# Patient Record
Sex: MALE | Race: WHITE | NOT HISPANIC OR LATINO | Employment: FULL TIME | URBAN - METROPOLITAN AREA
[De-identification: names, ages, dates, MRNs, and addresses within clinical notes are randomized per-mention and may not be internally consistent; named-entity substitution may affect disease eponyms.]

---

## 2017-12-05 ENCOUNTER — CLINICAL SUPPORT (OUTPATIENT)
Dept: INFECTIOUS DISEASES | Facility: CLINIC | Age: 47
End: 2017-12-05
Payer: COMMERCIAL

## 2017-12-05 ENCOUNTER — OFFICE VISIT (OUTPATIENT)
Dept: INFECTIOUS DISEASES | Facility: CLINIC | Age: 47
End: 2017-12-05
Payer: COMMERCIAL

## 2017-12-05 VITALS
HEART RATE: 81 BPM | WEIGHT: 288.13 LBS | BODY MASS INDEX: 33.34 KG/M2 | DIASTOLIC BLOOD PRESSURE: 79 MMHG | TEMPERATURE: 98 F | SYSTOLIC BLOOD PRESSURE: 111 MMHG | HEIGHT: 78 IN

## 2017-12-05 DIAGNOSIS — Z29.89 ENCOUNTER FOR ALTITUDE SICKNESS PROPHYLAXIS: ICD-10-CM

## 2017-12-05 DIAGNOSIS — Z23 NEED FOR IMMUNIZATION AGAINST TYPHOID: ICD-10-CM

## 2017-12-05 DIAGNOSIS — Z23 NEED FOR HEPATITIS A AND B VACCINATION: ICD-10-CM

## 2017-12-05 DIAGNOSIS — Z23 NEED FOR IMMUNIZATION AGAINST YELLOW FEVER: ICD-10-CM

## 2017-12-05 DIAGNOSIS — Z29.89 NEED FOR MALARIA PROPHYLAXIS: ICD-10-CM

## 2017-12-05 DIAGNOSIS — Z71.85 VACCINE COUNSELING: Primary | ICD-10-CM

## 2017-12-05 PROCEDURE — 99999 PR PBB SHADOW E&M-EST. PATIENT-LVL I: CPT | Mod: PBBFAC,,,

## 2017-12-05 PROCEDURE — 90471 IMMUNIZATION ADMIN: CPT | Mod: S$GLB,,, | Performed by: INTERNAL MEDICINE

## 2017-12-05 PROCEDURE — 90636 HEP A/HEP B VACC ADULT IM: CPT | Mod: S$GLB,,, | Performed by: INTERNAL MEDICINE

## 2017-12-05 PROCEDURE — 99203 OFFICE O/P NEW LOW 30 MIN: CPT | Mod: S$GLB,,, | Performed by: INTERNAL MEDICINE

## 2017-12-05 PROCEDURE — 99999 PR PBB SHADOW E&M-EST. PATIENT-LVL III: CPT | Mod: PBBFAC,,, | Performed by: INTERNAL MEDICINE

## 2017-12-05 PROCEDURE — 90717 YELLOW FEVER VACCINE SUBQ: CPT | Mod: S$GLB,,, | Performed by: INTERNAL MEDICINE

## 2017-12-05 PROCEDURE — 90472 IMMUNIZATION ADMIN EACH ADD: CPT | Mod: S$GLB,,, | Performed by: INTERNAL MEDICINE

## 2017-12-05 RX ORDER — ACETAZOLAMIDE 250 MG/1
250 TABLET ORAL 2 TIMES DAILY
Qty: 10 TABLET | Refills: 0 | Status: SHIPPED | OUTPATIENT
Start: 2017-12-05 | End: 2017-12-10

## 2017-12-05 RX ORDER — ATOVAQUONE AND PROGUANIL HYDROCHLORIDE 250; 100 MG/1; MG/1
1 TABLET, FILM COATED ORAL DAILY
Qty: 40 TABLET | Refills: 0 | Status: SHIPPED | OUTPATIENT
Start: 2017-12-05

## 2017-12-05 RX ORDER — AZITHROMYCIN 500 MG/1
500 TABLET, FILM COATED ORAL DAILY
Qty: 6 TABLET | Refills: 0 | Status: SHIPPED | OUTPATIENT
Start: 2017-12-05

## 2017-12-05 NOTE — PROGRESS NOTES
Patient received Twinrix and Stamaril Yellow Fever vaccines. Tolerated well and left in NAD. W/yellow card documentation.

## 2017-12-05 NOTE — PROGRESS NOTES
Subjective:      Chief Complaint: Pretravel consultation      History of Present Illness    Patient  47 y.o. male who presents today for routine pretravel consultation.     The patient will be traveling to [facility] on 1/5/2018 for 4 weeks. He plans on visiting the [place], [facility], and [facility]. He will be staying in bed and breakfasts. The purpose of the trip is vacation.     The patient reports that they received the following routine vaccinations: MMR, polio, influenza 2017, Tdap.         Review of Systems   Constitutional: Negative for chills, diaphoresis, fever and weight loss.   HENT: Negative for congestion, sinus pain and sore throat.    Eyes: Negative for photophobia and pain.   Respiratory: Negative for cough, sputum production and shortness of breath.    Cardiovascular: Negative for chest pain and leg swelling.   Gastrointestinal: Negative for abdominal pain, diarrhea, nausea and vomiting.   Genitourinary: Negative for dysuria and hematuria.   Musculoskeletal: Negative for joint pain.   Skin: Negative for rash.   Neurological: Negative for focal weakness and headaches.   Psychiatric/Behavioral: Negative for depression. The patient is not nervous/anxious.        Objective:   Physical Exam   Constitutional: He is oriented to person, place, and time and well-developed, well-nourished, and in no distress. No distress.   HENT:   Head: Normocephalic and atraumatic.   Eyes: Conjunctivae and EOM are normal.   Neck: Normal range of motion. Neck supple.   Cardiovascular: Normal rate.    Pulmonary/Chest: Effort normal. No respiratory distress.   Abdominal: Soft. He exhibits no distension.   Musculoskeletal: Normal range of motion. He exhibits no edema.   Neurological: He is alert and oriented to person, place, and time. Gait normal.   Skin: Skin is warm and dry. No rash noted. He is not diaphoretic. No erythema.   Psychiatric: Mood, memory, affect and judgment normal.   Vitals reviewed.     Assessment:    47-year-old male  - Pre-Travel clinic assessment  - Vaccine counseling  - Need for malaria prophylaxis  - Need for altitude sickness prophylaxis  - Need for hepatitis A and B vaccination  - Need for typhoid vaccination  - Need for yellow fever vaccination    Plan:     The Patient was provided with an extensive travel guidance packet which provides travel information specific to the patients itinerary.     The patient's medical history was reviewed and the patient was counseled on:  -Dietary precautions.  -Personal protective measures to prevent insect-borne diseases (e.g., malaria, dengue).  -Precautions to prevent exposure to rabies and seek treatment for possible exposures.  -Precautions against sun exposure.  -Precautions against development of DVT during flight.  -Personal and travel safety.    The patient's immunization history was reviewed and, based on the patient's itinerary, the following immunizations were ordered:  Hepatitis A/B at 0, 1, 6 months  Typhoid  Yellow fever    Because of the nationwide shortage of Yellow Fever vaccine, the patient was offered Stamaril vaccine through the research protocol. Inclusion and exclusion criteria were reviewed with the patient and the form completed. Risks and benefits were discussed. The consent was reviewed by me in detail with the patient and all questions were answered. The patient agrees to participate, and signed the consent. A copy was given to him. He was advised to return and or report any significant side effects related to the vaccine.      The patient was encouraged to contact us about any problems that may develop after immunization and possible side effects were reviewed.    The patient was instructed to purchase Imodium over the counter to take in case diarrhea (without blood or fever) develops. An antibiotic was ordered for treatment if severe or bloody diarrhea develops and the patient was instructed on use and possible side effects.  - If develops  3 episodes of diarrhea within 24 hours, take azithromycin 500 mg PO x 3 days     The patient was also instructed to purchase insect repellent containing DEET or [place] and apply according to repellent label instructions.  If indicated by the patients itinerary an anti-malarial agent was prescribed for malaria prophylaxis and possible side effects were reviewed.  - Atovaquone-proguanil 250-100 mg PO qdaily, start 2 days before expected exposure and continue for 7 days after last day of exposure.    Patient was given a prescription for altitude sickness prophylaxis.  - Acetazolamide 250 mg PO BID 48 hours before ascent and 48 hours at high altitude.     The patient was instructed to contact us if problems develop after travel.    Shruti Ang MD MPH  [facility]-Infectious Disease

## 2017-12-07 ENCOUNTER — HOSPITAL ENCOUNTER (OUTPATIENT)
Dept: PREADMISSION TESTING | Facility: HOSPITAL | Age: 47
Discharge: HOME | End: 2017-12-07
Attending: OTOLARYNGOLOGY
Payer: COMMERCIAL

## 2017-12-07 VITALS
BODY MASS INDEX: 32.97 KG/M2 | HEIGHT: 78 IN | TEMPERATURE: 97 F | RESPIRATION RATE: 18 BRPM | SYSTOLIC BLOOD PRESSURE: 135 MMHG | OXYGEN SATURATION: 97 % | DIASTOLIC BLOOD PRESSURE: 79 MMHG | HEART RATE: 78 BPM | WEIGHT: 285 LBS

## 2017-12-07 DIAGNOSIS — Z01.818 PRE-OP TESTING: Primary | ICD-10-CM

## 2017-12-07 LAB
APTT BLDCRRT: 26.8 SEC
BASOPHILS # BLD AUTO: 0.03 K/UL
BASOPHILS NFR BLD: 0.4 %
BILIRUB UR QL STRIP: NEGATIVE
CLARITY UR: CLEAR
COLOR UR: YELLOW
DIFFERENTIAL METHOD: ABNORMAL
EOSINOPHIL # BLD AUTO: 0.1 K/UL
EOSINOPHIL NFR BLD: 0.7 %
ERYTHROCYTE [DISTWIDTH] IN BLOOD BY AUTOMATED COUNT: 13.5 %
GLUCOSE UR QL STRIP: NEGATIVE
HCT VFR BLD AUTO: 39.2 %
HGB BLD-MCNC: 13.3 G/DL
HGB UR QL STRIP: NEGATIVE
INR PPP: 1
KETONES UR QL STRIP: NEGATIVE
LEUKOCYTE ESTERASE UR QL STRIP: NEGATIVE
LYMPHOCYTES # BLD AUTO: 1.6 K/UL
LYMPHOCYTES NFR BLD: 18.5 %
MCH RBC QN AUTO: 31.1 PG
MCHC RBC AUTO-ENTMCNC: 33.9 G/DL
MCV RBC AUTO: 92 FL
MICROSCOPIC COMMENT: ABNORMAL
MONOCYTES # BLD AUTO: 0.6 K/UL
MONOCYTES NFR BLD: 7.2 %
NEUTROPHILS # BLD AUTO: 6.1 K/UL
NEUTROPHILS NFR BLD: 73.2 %
NITRITE UR QL STRIP: NEGATIVE
NON-SQ EPI CELLS #/AREA URNS HPF: 2 /HPF
PH UR STRIP: 6 [PH] (ref 5–8)
PLATELET # BLD AUTO: 258 K/UL
PMV BLD AUTO: 9.9 FL
PROT UR QL STRIP: NEGATIVE
PROTHROMBIN TIME: 10.6 SEC
RBC # BLD AUTO: 4.27 M/UL
SP GR UR STRIP: 1.02 (ref 1–1.03)
URN SPEC COLLECT METH UR: NORMAL
UROBILINOGEN UR STRIP-ACNC: NEGATIVE EU/DL
WBC # BLD AUTO: 8.38 K/UL
WBC #/AREA URNS HPF: 2 /HPF (ref 0–5)

## 2017-12-07 PROCEDURE — 81000 URINALYSIS NONAUTO W/SCOPE: CPT

## 2017-12-07 PROCEDURE — 85610 PROTHROMBIN TIME: CPT

## 2017-12-07 PROCEDURE — 85025 COMPLETE CBC W/AUTO DIFF WBC: CPT

## 2017-12-07 PROCEDURE — 36415 COLL VENOUS BLD VENIPUNCTURE: CPT

## 2017-12-07 PROCEDURE — 85730 THROMBOPLASTIN TIME PARTIAL: CPT

## 2017-12-07 NOTE — DISCHARGE INSTRUCTIONS
Your surgery is scheduled for____12/12/2017_____________.    Call [phone] between 2 pm and 5 pm ___12/11/2017_________ to find out your arrival time for the day of surgery.    Report to SAME DAY SURGERY UNIT at _______am on the 2nd floor of the hospital.  Use the front entrance of the hospital before 6 am.  If you need wheelchair assistance, call [phone] from your cell phone,  or call 0 from the courtesy phone in the hospital lobby.    Important instructions:   Do not eat or drink after 12 midnight, including water.  It is okay to brush your teeth.  Do not have gum, candy or mints.       Please shower the night before and the morning of your surgery.       You may wear deodorant only.      Do not wear powder, body lotion or cologne.     Do not wear any jewelry or have any metal on your body.     Please bring any documents given to you by your doctor.     If you are going home on the same day of surgery, you must have arrangements for a ride home.  You will not be able to drive home if you were given anesthesia or sedation.     Stop taking Aspirin, Ibuprofen, Motrin and Aleve at least 7 days before your surgery. You may use Tylenol.     Stop taking fish oil and vitamin E for least 7 days before surgery.     Wear loose fitting clothes allowing for bandages.     Please leave money and valuables home.       You may bring your cell phone.     Call the doctor if fever or illness should occur before your surgery.    Call [phone] to contact us here at [facility] if needed.

## 2017-12-11 ENCOUNTER — ANESTHESIA EVENT (OUTPATIENT)
Dept: SURGERY | Facility: HOSPITAL | Age: 47
End: 2017-12-11
Payer: COMMERCIAL

## 2017-12-12 ENCOUNTER — ANESTHESIA (OUTPATIENT)
Dept: SURGERY | Facility: HOSPITAL | Age: 47
End: 2017-12-12
Payer: COMMERCIAL

## 2017-12-12 ENCOUNTER — HOSPITAL ENCOUNTER (OUTPATIENT)
Facility: HOSPITAL | Age: 47
Discharge: HOME | End: 2017-12-12
Attending: OTOLARYNGOLOGY | Admitting: OTOLARYNGOLOGY
Payer: COMMERCIAL

## 2017-12-12 VITALS
SYSTOLIC BLOOD PRESSURE: 116 MMHG | DIASTOLIC BLOOD PRESSURE: 74 MMHG | OXYGEN SATURATION: 97 % | WEIGHT: 285 LBS | RESPIRATION RATE: 18 BRPM | BODY MASS INDEX: 32.97 KG/M2 | HEIGHT: 78 IN | TEMPERATURE: 98 F | HEART RATE: 68 BPM

## 2017-12-12 DIAGNOSIS — J34.2 NOSE SEPTUM DEVIATION: Primary | ICD-10-CM

## 2017-12-12 PROCEDURE — 71000015 HC POSTOP RECOV 1ST HR: Performed by: OTOLARYNGOLOGY

## 2017-12-12 PROCEDURE — 27201423 OPTIME MED/SURG SUP & DEVICES STERILE SUPPLY: Performed by: OTOLARYNGOLOGY

## 2017-12-12 PROCEDURE — 37000009 HC ANESTHESIA EA ADD 15 MINS: Performed by: OTOLARYNGOLOGY

## 2017-12-12 PROCEDURE — 88305 TISSUE EXAM BY PATHOLOGIST: CPT | Performed by: PATHOLOGY

## 2017-12-12 PROCEDURE — 25000003 PHARM REV CODE 250: Performed by: NURSE ANESTHETIST, CERTIFIED REGISTERED

## 2017-12-12 PROCEDURE — 63600175 PHARM REV CODE 636 W HCPCS: Performed by: NURSE ANESTHETIST, CERTIFIED REGISTERED

## 2017-12-12 PROCEDURE — 88305 TISSUE EXAM BY PATHOLOGIST: CPT | Mod: 26,,, | Performed by: PATHOLOGY

## 2017-12-12 PROCEDURE — 63600175 PHARM REV CODE 636 W HCPCS: Performed by: OTOLARYNGOLOGY

## 2017-12-12 PROCEDURE — S0028 INJECTION, FAMOTIDINE, 20 MG: HCPCS | Performed by: NURSE ANESTHETIST, CERTIFIED REGISTERED

## 2017-12-12 PROCEDURE — D9220A PRA ANESTHESIA: Mod: ANES,,, | Performed by: ANESTHESIOLOGY

## 2017-12-12 PROCEDURE — D9220A PRA ANESTHESIA: Mod: CRNA,,, | Performed by: NURSE ANESTHETIST, CERTIFIED REGISTERED

## 2017-12-12 PROCEDURE — 71000033 HC RECOVERY, INTIAL HOUR: Performed by: OTOLARYNGOLOGY

## 2017-12-12 PROCEDURE — 88304 TISSUE EXAM BY PATHOLOGIST: CPT | Mod: 26,,, | Performed by: PATHOLOGY

## 2017-12-12 PROCEDURE — 36000711: Performed by: OTOLARYNGOLOGY

## 2017-12-12 PROCEDURE — 37000008 HC ANESTHESIA 1ST 15 MINUTES: Performed by: OTOLARYNGOLOGY

## 2017-12-12 PROCEDURE — 71000016 HC POSTOP RECOV ADDL HR: Performed by: OTOLARYNGOLOGY

## 2017-12-12 PROCEDURE — 36000710: Performed by: OTOLARYNGOLOGY

## 2017-12-12 PROCEDURE — 25000003 PHARM REV CODE 250: Performed by: ANESTHESIOLOGY

## 2017-12-12 PROCEDURE — 25000003 PHARM REV CODE 250: Performed by: OTOLARYNGOLOGY

## 2017-12-12 PROCEDURE — C2625 STENT, NON-COR, TEM W/DEL SY: HCPCS | Performed by: OTOLARYNGOLOGY

## 2017-12-12 DEVICE — IMPLANT PROPEL MOMETASONE: Type: IMPLANTABLE DEVICE | Site: NOSE | Status: FUNCTIONAL

## 2017-12-12 RX ORDER — LABETALOL HYDROCHLORIDE 5 MG/ML
INJECTION, SOLUTION INTRAVENOUS
Status: DISCONTINUED | OUTPATIENT
Start: 2017-12-12 | End: 2017-12-12

## 2017-12-12 RX ORDER — ROCURONIUM BROMIDE 10 MG/ML
INJECTION, SOLUTION INTRAVENOUS
Status: DISCONTINUED | OUTPATIENT
Start: 2017-12-12 | End: 2017-12-12

## 2017-12-12 RX ORDER — SODIUM CHLORIDE, SODIUM LACTATE, POTASSIUM CHLORIDE, CALCIUM CHLORIDE 600; 310; 30; 20 MG/100ML; MG/100ML; MG/100ML; MG/100ML
INJECTION, SOLUTION INTRAVENOUS CONTINUOUS
Status: DISCONTINUED | OUTPATIENT
Start: 2017-12-12 | End: 2017-12-12 | Stop reason: HOSPADM

## 2017-12-12 RX ORDER — NEOSTIGMINE METHYLSULFATE 1 MG/ML
INJECTION, SOLUTION INTRAVENOUS
Status: DISCONTINUED | OUTPATIENT
Start: 2017-12-12 | End: 2017-12-12

## 2017-12-12 RX ORDER — DEXAMETHASONE SODIUM PHOSPHATE 4 MG/ML
INJECTION, SOLUTION INTRA-ARTICULAR; INTRALESIONAL; INTRAMUSCULAR; INTRAVENOUS; SOFT TISSUE
Status: DISCONTINUED | OUTPATIENT
Start: 2017-12-12 | End: 2017-12-12

## 2017-12-12 RX ORDER — LIDOCAINE HYDROCHLORIDE 20 MG/ML
JELLY TOPICAL
Status: DISCONTINUED | OUTPATIENT
Start: 2017-12-12 | End: 2017-12-12

## 2017-12-12 RX ORDER — LIDOCAINE HCL/PF 100 MG/5ML
SYRINGE (ML) INTRAVENOUS
Status: DISCONTINUED | OUTPATIENT
Start: 2017-12-12 | End: 2017-12-12

## 2017-12-12 RX ORDER — SUCCINYLCHOLINE CHLORIDE 20 MG/ML
INJECTION INTRAMUSCULAR; INTRAVENOUS
Status: DISCONTINUED | OUTPATIENT
Start: 2017-12-12 | End: 2017-12-12

## 2017-12-12 RX ORDER — PHENYLEPHRINE HYDROCHLORIDE 10 MG/ML
INJECTION INTRAVENOUS
Status: DISCONTINUED | OUTPATIENT
Start: 2017-12-12 | End: 2017-12-12

## 2017-12-12 RX ORDER — FAMOTIDINE 10 MG/ML
INJECTION INTRAVENOUS
Status: DISCONTINUED | OUTPATIENT
Start: 2017-12-12 | End: 2017-12-12

## 2017-12-12 RX ORDER — LIDOCAINE HYDROCHLORIDE 10 MG/ML
1 INJECTION, SOLUTION EPIDURAL; INFILTRATION; INTRACAUDAL; PERINEURAL ONCE
Status: DISCONTINUED | OUTPATIENT
Start: 2017-12-12 | End: 2017-12-12 | Stop reason: HOSPADM

## 2017-12-12 RX ORDER — PROPOFOL 10 MG/ML
VIAL (ML) INTRAVENOUS
Status: DISCONTINUED | OUTPATIENT
Start: 2017-12-12 | End: 2017-12-12

## 2017-12-12 RX ORDER — METOCLOPRAMIDE HYDROCHLORIDE 5 MG/ML
INJECTION INTRAMUSCULAR; INTRAVENOUS
Status: DISCONTINUED | OUTPATIENT
Start: 2017-12-12 | End: 2017-12-12

## 2017-12-12 RX ORDER — SODIUM CHLORIDE 0.9 G/100ML
IRRIGANT IRRIGATION
Status: DISCONTINUED | OUTPATIENT
Start: 2017-12-12 | End: 2017-12-12 | Stop reason: HOSPADM

## 2017-12-12 RX ORDER — ONDANSETRON 2 MG/ML
INJECTION INTRAMUSCULAR; INTRAVENOUS
Status: DISCONTINUED | OUTPATIENT
Start: 2017-12-12 | End: 2017-12-12

## 2017-12-12 RX ORDER — MIDAZOLAM HYDROCHLORIDE 1 MG/ML
INJECTION, SOLUTION INTRAMUSCULAR; INTRAVENOUS
Status: DISCONTINUED | OUTPATIENT
Start: 2017-12-12 | End: 2017-12-12

## 2017-12-12 RX ORDER — GLYCOPYRROLATE 0.2 MG/ML
INJECTION INTRAMUSCULAR; INTRAVENOUS
Status: DISCONTINUED | OUTPATIENT
Start: 2017-12-12 | End: 2017-12-12

## 2017-12-12 RX ORDER — EPINEPHRINE 1 MG/ML
INJECTION, SOLUTION INTRACARDIAC; INTRAMUSCULAR; INTRAVENOUS; SUBCUTANEOUS
Status: DISCONTINUED | OUTPATIENT
Start: 2017-12-12 | End: 2017-12-12 | Stop reason: HOSPADM

## 2017-12-12 RX ORDER — FENTANYL CITRATE 50 UG/ML
INJECTION, SOLUTION INTRAMUSCULAR; INTRAVENOUS
Status: DISCONTINUED | OUTPATIENT
Start: 2017-12-12 | End: 2017-12-12

## 2017-12-12 RX ORDER — HYDROCODONE BITARTRATE AND ACETAMINOPHEN 5; 325 MG/1; MG/1
1 TABLET ORAL EVERY 4 HOURS PRN
Status: DISCONTINUED | OUTPATIENT
Start: 2017-12-12 | End: 2017-12-12 | Stop reason: HOSPADM

## 2017-12-12 RX ADMIN — FAMOTIDINE 20 MG: 10 INJECTION, SOLUTION INTRAVENOUS at 07:12

## 2017-12-12 RX ADMIN — PROPOFOL 70 MG: 10 INJECTION, EMULSION INTRAVENOUS at 08:12

## 2017-12-12 RX ADMIN — FENTANYL CITRATE 25 MCG: 50 INJECTION INTRAMUSCULAR; INTRAVENOUS at 09:12

## 2017-12-12 RX ADMIN — GLYCOPYRROLATE 0.2 MG: 0.2 INJECTION, SOLUTION INTRAMUSCULAR; INTRAVENOUS at 08:12

## 2017-12-12 RX ADMIN — DEXAMETHASONE SODIUM PHOSPHATE 12 MG: 4 INJECTION, SOLUTION INTRAMUSCULAR; INTRAVENOUS at 07:12

## 2017-12-12 RX ADMIN — LABETALOL HYDROCHLORIDE 2.5 MG: 5 INJECTION, SOLUTION INTRAVENOUS at 08:12

## 2017-12-12 RX ADMIN — PHENYLEPHRINE HYDROCHLORIDE 100 MCG: 10 INJECTION INTRAVENOUS at 08:12

## 2017-12-12 RX ADMIN — PHENYLEPHRINE HYDROCHLORIDE 200 MCG: 10 INJECTION INTRAVENOUS at 09:12

## 2017-12-12 RX ADMIN — SUCCINYLCHOLINE CHLORIDE 100 MG: 20 INJECTION, SOLUTION INTRAMUSCULAR; INTRAVENOUS at 07:12

## 2017-12-12 RX ADMIN — HYDROCODONE BITARTRATE AND ACETAMINOPHEN 1 TABLET: 5; 325 TABLET ORAL at 10:12

## 2017-12-12 RX ADMIN — ROCURONIUM BROMIDE 10 MG: 10 INJECTION, SOLUTION INTRAVENOUS at 09:12

## 2017-12-12 RX ADMIN — SODIUM CHLORIDE, SODIUM LACTATE, POTASSIUM CHLORIDE, AND CALCIUM CHLORIDE: .6; .31; .03; .02 INJECTION, SOLUTION INTRAVENOUS at 10:12

## 2017-12-12 RX ADMIN — PROPOFOL 50 MG: 10 INJECTION, EMULSION INTRAVENOUS at 08:12

## 2017-12-12 RX ADMIN — PROPOFOL 70 MG: 10 INJECTION, EMULSION INTRAVENOUS at 07:12

## 2017-12-12 RX ADMIN — METOCLOPRAMIDE 10 MG: 5 INJECTION, SOLUTION INTRAMUSCULAR; INTRAVENOUS at 07:12

## 2017-12-12 RX ADMIN — PHENYLEPHRINE HYDROCHLORIDE 200 MCG: 10 INJECTION INTRAVENOUS at 08:12

## 2017-12-12 RX ADMIN — GLYCOPYRROLATE 0.6 MG: 0.2 INJECTION, SOLUTION INTRAMUSCULAR; INTRAVENOUS at 09:12

## 2017-12-12 RX ADMIN — ONDANSETRON 4 MG: 2 INJECTION, SOLUTION INTRAMUSCULAR; INTRAVENOUS at 09:12

## 2017-12-12 RX ADMIN — MIDAZOLAM HYDROCHLORIDE 2 MG: 1 INJECTION, SOLUTION INTRAMUSCULAR; INTRAVENOUS at 07:12

## 2017-12-12 RX ADMIN — NEOSTIGMINE METHYLSULFATE 0.5 MG: 1 INJECTION INTRAVENOUS at 09:12

## 2017-12-12 RX ADMIN — FENTANYL CITRATE 100 MCG: 50 INJECTION INTRAMUSCULAR; INTRAVENOUS at 07:12

## 2017-12-12 RX ADMIN — LIDOCAINE HYDROCHLORIDE 100 MG: 20 INJECTION, SOLUTION INTRAVENOUS at 07:12

## 2017-12-12 RX ADMIN — LABETALOL HYDROCHLORIDE 5 MG: 5 INJECTION, SOLUTION INTRAVENOUS at 08:12

## 2017-12-12 RX ADMIN — PROPOFOL 200 MG: 10 INJECTION, EMULSION INTRAVENOUS at 07:12

## 2017-12-12 RX ADMIN — PHENYLEPHRINE HYDROCHLORIDE 150 MCG: 10 INJECTION INTRAVENOUS at 09:12

## 2017-12-12 RX ADMIN — FENTANYL CITRATE 50 MCG: 50 INJECTION INTRAMUSCULAR; INTRAVENOUS at 08:12

## 2017-12-12 RX ADMIN — SODIUM CHLORIDE, SODIUM LACTATE, POTASSIUM CHLORIDE, AND CALCIUM CHLORIDE: .6; .31; .03; .02 INJECTION, SOLUTION INTRAVENOUS at 06:12

## 2017-12-12 RX ADMIN — LIDOCAINE HYDROCHLORIDE 1 EACH: 20 JELLY TOPICAL at 07:12

## 2017-12-12 RX ADMIN — ROCURONIUM BROMIDE 30 MG: 10 INJECTION, SOLUTION INTRAVENOUS at 08:12

## 2017-12-12 RX ADMIN — PHENYLEPHRINE HYDROCHLORIDE 100 MCG: 10 INJECTION INTRAVENOUS at 07:12

## 2017-12-12 RX ADMIN — GLYCOPYRROLATE 0.2 MG: 0.2 INJECTION, SOLUTION INTRAMUSCULAR; INTRAVENOUS at 07:12

## 2017-12-12 NOTE — ANESTHESIA POSTPROCEDURE EVALUATION
"Anesthesia Post Evaluation    Patient: Edilberto Sue    Procedure(s) Performed: Procedure(s) (LRB):  RECONSTRUCTION-NASAL (N/A)  CAUTERIZATION-RESECTION OF TURBINATES-W/DEBRIDER (Bilateral)  SINUS SURGERY FUNCTIONAL ENDOSCOPIC WITH NAVIGATION (N/A)  [id number] (Bilateral)  ETHMOIDECTOMY (Bilateral)    Final Anesthesia Type: general  Patient location during evaluation: PACU  Patient participation: Yes- Able to Participate  Level of consciousness: awake and alert, oriented and awake  Post-procedure vital signs: reviewed and stable  Airway patency: patent  PONV status at discharge: No PONV  Anesthetic complications: no      Cardiovascular status: blood pressure returned to baseline  Respiratory status: unassisted, spontaneous ventilation and room air  Hydration status: euvolemic  Follow-up not needed.        Visit Vitals  /74 (BP Location: Right arm, Patient Position: Lying)   Pulse 68   Temp 36.4 °C (97.6 °F) (Oral)   Resp 18   Ht 6' 6" (1.981 m)   Wt 129.3 kg (285 lb)   SpO2 97%   BMI 32.94 kg/m²       Pain/Symone Score: Pain Assessment Performed: Yes (12/12/2017 11:40 AM)  Presence of Pain: complains of pain/discomfort (12/12/2017 11:40 AM)  Pain Rating Prior to Med Admin: 5 (12/12/2017 10:49 AM)  Pain Rating Post Med Admin: 3 (12/12/2017 11:40 AM)  Symone Score: 10 (12/12/2017 10:43 AM)  Modified Symone Score: 20 (12/12/2017 10:46 AM)      "

## 2017-12-12 NOTE — DISCHARGE INSTRUCTIONS
"Pain Medication Last Given at 10:49   Dr Carmona Nasal Surgery Instructions.    Office # [phone]    Do not take aspirin, Advil, (Ibuprofen) etc after surgery.  Tylenol is fine.  The aspirin like medications can cause you to bleed easily.    Do not blow your nose.  You may "sniff" to clear your nose.  You may clean the end of your nose gently.  If congestions is extreme and you are miserable, you may use Afrin nasal spray to open the nose and relieve some congestion.  Do not use Afrin more than twice a day.  The period of congestion is about a week.     We do not expect you to have a nosebleed after surgery.  Call the office if your nose begins to bleed bright red blood.  About one to two weeks after surgery the sinuses begin to work again and to rid themselves of any old blood from surgery.  At this time, you may sniff and see old, purple blood in the mucous you spit up.  This is of no concern and does not last long.    Activity is limited to indoor movement and should be minimal during the week of your surgery.  This is a serious surgery and patients often forget because they do not look bruised or swollen externally.  No lifting or straining or overheating is allowed.  Exercise such as golf, tennis, aerobic exercise, walking in the heat, etc, are forbidden for 4-6 weeks.  Limited work indoors without exertion may be resumed after you see the doctor.    Do not drive, drink alcohol, or sign legal documents for 24 hours, or if taking narcotic pain medication.      If your occupation involves heavy lifting, physical exertion, or a hot or damian environment, you will need to make arrangements for light duty indoors or to take 4-6 weeks off to allow for proper healing.      Resume your regular medications after surgery.  If you use nose spray or allergy and sinus medications, please discuss them with the office before resuming them after surgery.    Be sure to keep to keep your follow up appointment at the " office.    Fall Prevention  Millions of people fall every year and injure themselves. You may have had anesthesia or sedation which may increase your risk of falling. You may have health issues that put you at an increased risk of falling.     Here are ways to reduce your risk of falling.  ·   · Make your home safe by keeping walkways clear of objects you may trip over.  · Use non-slip pads under rugs. Do not use area rugs or small throw rugs.  · Use non-slip mats in bathtubs and showers.  · Install handrails and lights on staircases.  · Do not walk in poorly lit areas.  · Do not stand on chairs or wobbly ladders.  · Use caution when reaching overhead or looking upward. This position can cause a loss of balance.  · Be sure your shoes fit properly, have non-slip bottoms and are in good condition.   · Wear shoes both inside and out. Avoid going barefoot or wearing slippers.  · Be cautious when going up and down stairs, curbs, and when walking on uneven sidewalks.  · If your balance is poor, consider using a cane or walker.  · If your fall was related to alcohol use, stop or limit alcohol intake.   · If your fall was related to use of sleeping medicines, talk to your doctor about this. You may need to reduce your dosage at bedtime if you awaken during the night to go to the bathroom.    · To reduce the need for nighttime bathroom trips:  ¨ Avoid drinking fluids for several hours before going to bed  ¨ Empty your bladder before going to bed  ¨ Men can keep a urinal at the bedside  · Stay as active as you can. Balance, flexibility, strength, and endurance all come from exercise. They all play a role in preventing falls. Ask your healthcare provider which types of activity are right for you.  · Get your vision checked on a regular basis.  · If you have pets, know where they are before you stand up or walk so you don't trip over them.  · Use night lights.

## 2017-12-12 NOTE — INTERVAL H&P NOTE
The patient has been examined and the H&P has been reviewed:    I concur with the findings and no changes have occurred since H&P was written.    Anesthesia/Surgery risks, benefits and alternative options discussed and understood by patient/family.          Active Hospital Problems    Diagnosis  POA    Nose septum deviation [J34.2]  Yes      Resolved Hospital Problems    Diagnosis Date Resolved POA   No resolved problems to display.

## 2017-12-12 NOTE — BRIEF OP NOTE
Brief Operative Note     SUMMARY     Surgery Date: 12/12/2017     Surgeon(s) and Role:     * Gurinder Carmona MD - Primary    Pre-op Diagnosis:  Nose septum deviation [J34.2]  Hypertrophy of nasal turbinates [J34.3]  Cyst, nasal sinus [J34.1]  Nasal obstruction [J34.89]  Chronic maxillary sinusitis [J32.0]  Chronic ethmoidal sinusitis [J32.2]    Post-op Diagnosis:  Nose septum deviation [J34.2]  Hypertrophy of nasal turbinates [J34.3]  Cyst, nasal sinus [J34.1]  Nasal obstruction [J34.89]  Chronic maxillary sinusitis [J32.0]  Chronic ethmoidal sinusitis [J32.2]    Procedure(s) (LRB):  RECONSTRUCTION-NASAL (N/A)  CAUTERIZATION-RESECTION OF TURBINATES-W/DEBRIDER (Bilateral)  SINUS SURGERY FUNCTIONAL ENDOSCOPIC WITH NAVIGATION (N/A)  [id number] (Bilateral)  ETHMOIDECTOMY (Bilateral)    Anesthesia: General    Findings/Key Components:  Severe NSD to left, dislocation septum to left floor of nose, hypertrophic inferior turbinates with bilateal nasal obstruction, bilateral infundibular obstruction and middle meatus compression with large right maxillary cyst, small left maxillary cyst, bilateral scattered anterior ethmoid disease    Estimated Blood Loss: 40 ml.    Floseal - 2  XRS  Propel stents - 2 regular         Specimens     Start     Ordered    12/12/17 0941  Specimen to Pathology - Surgery  Once      12/12/17 0942            Thank You  Gurinder Carmona MD

## 2017-12-12 NOTE — ANESTHESIA PREPROCEDURE EVALUATION
12/12/2017  Edilberto Sue is a 47 y.o., male.    Anesthesia Evaluation    I have reviewed the Patient Summary Reports.    I have reviewed the Nursing Notes.   I have reviewed the Medications.     Review of Systems  Anesthesia Hx:  No problems with previous Anesthesia Denies Hx of Anesthetic complications  Neg history of prior surgery. Denies Family Hx of Anesthesia complications.   Denies Personal Hx of Anesthesia complications.   Social:  No Alcohol Use, Smoker    Hematology/Oncology:  Hematology Normal   Oncology Normal     EENT/Dental:EENT/Dental Normal   Cardiovascular:  Cardiovascular Normal Exercise tolerance: good     Pulmonary:  Pulmonary Normal    Renal/:  Renal/ Normal     Hepatic/GI:  Hepatic/GI Normal    Musculoskeletal:  Musculoskeletal Normal    Neurological:  Neurology Normal    Endocrine:  Endocrine Normal    Dermatological:  Skin Normal    Psych:  Psychiatric Normal           Physical Exam  General:  Well nourished    Airway/Jaw/Neck:  Airway Findings: Mouth Opening: Normal General Airway Assessment: Adult  Mallampati: II  TM Distance: Normal, at least 6 cm         Dental:  DENTAL FINDINGS: Normal   Chest/Lungs:  Chest/Lungs Clear    Heart/Vascular:  Heart Findings: Normal Heart murmur: negative       Mental Status:  Mental Status Findings:  Cooperative, Alert and Oriented         Anesthesia Plan  Type of Anesthesia, risks & benefits discussed:  Anesthesia Type:  general  Patient's Preference:   Intra-op Monitoring Plan:   Intra-op Monitoring Plan Comments:   Post Op Pain Control Plan:   Post Op Pain Control Plan Comments:   Induction:   IV  Beta Blocker:  Patient is not currently on a Beta-Blocker (No further documentation required).       Informed Consent: Patient understands risks and agrees with Anesthesia plan.  Questions answered. Anesthesia consent signed with patient.  ASA  Score: 2     Day of Surgery Review of History & Physical:            Ready For Surgery From Anesthesia Perspective.

## 2017-12-12 NOTE — TRANSFER OF CARE
"Anesthesia Transfer of Care Note    Patient: Edilberto Sue    Procedure(s) Performed: Procedure(s) (LRB):  RECONSTRUCTION-NASAL (N/A)  CAUTERIZATION-RESECTION OF TURBINATES-W/DEBRIDER (Bilateral)  SINUS SURGERY FUNCTIONAL ENDOSCOPIC WITH NAVIGATION (N/A)  [id number] (Bilateral)  ETHMOIDECTOMY (Bilateral)    Patient location: PACU    Anesthesia Type: general    Transport from OR: Transported from OR on room air with adequate spontaneous ventilation    Post pain: adequate analgesia    Post assessment: no apparent anesthetic complications    Post vital signs: stable    Level of consciousness: sedated    Nausea/Vomiting: no nausea/vomiting    Complications: none    Transfer of care protocol was followed      Last vitals:   Visit Vitals  /61   Pulse (!) 58   Temp 36.4 °C (97.5 °F) (Oral)   Resp 16   Ht 6' 6" (1.981 m)   Wt 129.3 kg (285 lb)   SpO2 96%   BMI 32.94 kg/m²     "

## 2017-12-21 NOTE — OP NOTE
DATE OF PROCEDURE:  12/12/2017    SURGEON:  Gurinder Carmona M.D.    OPERATION:  Image-guided functional endoscopic sinus surgery with bilateral   maxillary antrostomy with removal of large right maxillary sinus cyst.    Bilateral anterior ethmoidectomy and nasal septal reconstruction with turbinate   reduction with the debrider and cautery.    PREOPERATIVE DIAGNOSES:  Severe nasal septal deviation to the left, dislocated   in the left nasal floor, convex to the left concave to the right with a large   left bone spur, hypertrophic inferior and middle turbinates with bilateral nasal   obstruction, bilateral middle turbinate compression of the middle meatus and   lateral uncinate compression with bilateral chronic ethmoid disease, large right   maxillary sinus cyst.    POSTOPERATIVE DIAGNOSES:  Severe nasal septal deviation to the left, dislocated   in the left nasal floor, convex to the left concave to the right with a large   left bone spur, hypertrophic inferior and middle turbinates with bilateral nasal   obstruction, bilateral middle turbinate compression of the middle meatus and   lateral uncinate compression with bilateral chronic ethmoid disease, large right   maxillary sinus cyst.    PROCEDURE IN DETAIL:  Edilberto Sue was given adequate preoperative sedation,   brought to the Operating Room where he was placed in supine position and placed   under general endotracheal anesthesia.  He was prepped and draped in usual   sterile fashion for surgery of the nose and sinuses.  The image-guided system   was attached to the patient.  Examination of his external nose revealed minor   deformity, but essentially unremarkable.  He had a slight bulging of the left   lower lateral cartilage of his nose externally.  Internally; however, the nasal   septum was buckled being dislocated in the left nasal floor and convex to the   left with a large left posterior bone spur, hypertrophy of the inferior and   middle turbinates with  bilateral nasal obstruction.  The buckling of his nasal   septum was severe.    The nasal septum was injected with approximately 5 mL of 1:60,000 solution of   epinephrine and saline.  A transfixed incision was made, mucoperichondrial flaps   were elevated and with a tedious dissection, the nasal septum was dislocated   from the floor of the nose and straightened as much as it could be straightened   and placed back on the maxillary crest.  It was also dislocated from the nasal   spine anteriorly in order to move it to the midline behind the columella.  A   large bone spur was removed by making an incision to separate the quadrangular   septal cartilage from the perpendicular plate of ethmoid and vomer and the bony   spur and deviated, perpendicular plate of the ethmoid was removed using   Stephanie forceps.  No cartilage from the central portion of the nasal   septal cartilage was removed.    With the septum repositioned in the midline and straightened, it was splinted   with x-ray splints that were sutured in with a single 3-0 Prolene suture.  The   transfixion incision was closed with interrupted 4-0 chromic sutures.    Outfracture and submucosal removal of the inferior turbinate tissue was done   bilaterally followed by Bovie and needle cauterization for hemostasis.  This was   done bilaterally front to back in the nose as his nose were obstructed all the   way to the posterior choana bilaterally.  With this done, the camera system   could be introduced into his nose and beginning on the right side, a very large   middle turbinate was downsized and retracted medially exposing the middle   meatus.  There was a lateral compression of the uncinate process.  It was   removed and the maxillary ostium was obstructed.  It was open widely and a large   fluid-filled cyst was noted to fill most of the maxillary sinus on the right.    This was removed with 90-degree forceps with the camera system.  Using the    image-guided system, the ethmoid bulla was opened and from inferior to superior   from posterior to anterior an ethmoidectomy was done with polypoid disease   tissue being removed from scattered areas from the frontal recess all the way to   the posterior cells.  The posterior ethmoid did not appear to be involved with   any disease and was left alone.  This side was packed and attention was turned   to the left side.  Again, there was very large and lateralized middle turbinate,   which was downsized and retracted medially.  The uncinate process on the left   just as it had been so on the right was very lateralized and compressed the   uncinate process causing obstruction.  The uncinate process was removed and the   maxillary natural ostium was very small and obstructed.  It was opened widely.    This was just as it had been on the other side.    Again, there was ethmoid obstruction and the ethmoid bulla was open and there   was scattered ethmoid disease throughout the anterior ethmoid cells, working   from anteriorly to superiorly and from posterior to anterior to the frontal   recess an anterior ethmoidectomy was done.  The posterior cells appeared clear   and were left alone.    With this done, the left side of the nose was also packed.  All packs were then   removed.  He was thoroughly suctioned and a Propel regular stent was placed in   the ethmoid sinus on each side of the nose followed by a unit of FloSeal to   prevent oozing.  The procedure was then terminated after he was thoroughly   suctioned and he was awakened, extubated and taken to the Recovery Room in good   condition with estimated total blood loss of approximately 40 mL or less.      EBR/HN  dd: 12/20/2017 13:33:59 (CST)  td: 12/20/2017 19:25:01 (CST)  Doc ID   #[id number]  Job ID #[id number]    CC:

## 2017-12-21 NOTE — DISCHARGE SUMMARY
DATE OF ADMISSION:  12/12/2017    DATE OF DISCHARGE:  12/12/2017    FINAL DIAGNOSES:  Nasal septal deviation with hypertrophic inferior and middle   turbinates with bilateral nasal obstruction, bilateral infundibular and middle   meatus obstruction with chronic bilateral maxillary and ethmoid sinusitis, large   right maxillary sinus cyst.    PROCEDURE WHILE IN HOSPITAL:  Image-guided functional endoscopic sinus surgery   with bilateral maxillary antrostomy with removal of a large right maxillary   sinus cyst.  There is right maxillary sinus cyst and bilateral anterior   ethmoidectomy, nasal septal reconstruction with turbinate reduction with   debrider and cautery.    CLINICAL HISTORY:  Edilberto Sue is a 47-year-old  who   presented to my office with severe nasal obstruction, which is causing him   difficulty breathing all the time.  He has also complained of persistent chronic   sinusitis which has not cleared with medical treatment.    A CT scan was obtained, which revealed the same severe nasal septal deviation   and hypertrophic nasal turbinates with nasal obstruction that could be seen on   office exam, but also with  bilateral maxillary and ethmoid obstruction and   sinusitis, particularly in the anterior ethmoid cells bilaterally and a large   right maxillary sinus cyst.    He was admitted to the hospital and taken to the Operating Room where a nasal   septal reconstruction with turbinate reduction and cautery was done along with   an image-guided functional endoscopic sinus surgery with bilateral maxillary   antrostomy and bilateral anterior ethmoidectomies and removal of a right   maxillary sinus cyst.  He is now postoperatively doing well.  He is discharged   to follow up in my office in 1 week.  His condition at discharge is good.    Laboratory obtained for the hospital was satisfactory.  Careful postoperative   instructions were given orally as well as in writing to Mr. Sue and his    family.  He will be seen in my office in 1 week.  His activity is to be   sedentary at home with mostly head elevation.  He continues Afrin nose spray if   he has nasal congestion and his diet is not restricted.      EBR/SUSSY  dd: 12/20/2017 13:33:59 (CST)  td: 12/20/2017 19:27:20 (CST)  Doc ID   #[id number]  Job ID #[id number]    CC:

## 2018-01-05 ENCOUNTER — CLINICAL SUPPORT (OUTPATIENT)
Dept: INFECTIOUS DISEASES | Facility: CLINIC | Age: 48
End: 2018-01-05
Payer: COMMERCIAL

## 2018-01-05 PROCEDURE — 90636 HEP A/HEP B VACC ADULT IM: CPT | Mod: S$GLB,,, | Performed by: INTERNAL MEDICINE

## 2018-01-05 PROCEDURE — 99999 PR PBB SHADOW E&M-EST. PATIENT-LVL I: CPT | Mod: PBBFAC,,,

## 2018-01-05 PROCEDURE — 90471 IMMUNIZATION ADMIN: CPT | Mod: S$GLB,,, | Performed by: INTERNAL MEDICINE

## 2018-06-08 ENCOUNTER — CLINICAL SUPPORT (OUTPATIENT)
Dept: INFECTIOUS DISEASES | Facility: CLINIC | Age: 48
End: 2018-06-08
Payer: COMMERCIAL

## 2018-06-08 PROCEDURE — 90636 HEP A/HEP B VACC ADULT IM: CPT | Mod: S$GLB,,, | Performed by: INTERNAL MEDICINE

## 2018-06-08 PROCEDURE — 99999 PR PBB SHADOW E&M-EST. PATIENT-LVL I: CPT | Mod: PBBFAC,,,

## 2018-06-08 PROCEDURE — 90471 IMMUNIZATION ADMIN: CPT | Mod: S$GLB,,, | Performed by: INTERNAL MEDICINE

## (undated) DEVICE — SOL NS 1000CC

## (undated) DEVICE — Device

## (undated) DEVICE — GLOVE SURG BIOGEL LATEX SZ 7.5

## (undated) DEVICE — BLADE TRICUT

## (undated) DEVICE — NDL 27G X 1 1/4

## (undated) DEVICE — SYR 3CC LUER LOC

## (undated) DEVICE — SYR 12CC CNTRL L-L NO NDL

## (undated) DEVICE — SYR 30CC LUER LOCK

## (undated) DEVICE — SET EXT W/2 VLVE PORTS 40

## (undated) DEVICE — POSITIONER HEAD DONUT 9IN FOAM

## (undated) DEVICE — TUBING XPS IRRIG TO STRAIGHTSH

## (undated) DEVICE — SEE MEDLINE ITEM 157117

## (undated) DEVICE — SUPPORT ULNA NERVE PROTECTOR

## (undated) DEVICE — PATIENT TRACKER ENT

## (undated) DEVICE — MATRIX HEMSTAT FLOSEAL 5ML

## (undated) DEVICE — SET FLUID IV WARMING W/INSUL

## (undated) DEVICE — BLANKET LOWER BODY 55.9X40.2IN

## (undated) DEVICE — NDL 18GA X1 1/2 REG BEVEL

## (undated) DEVICE — PADS ADHESIVE

## (undated) DEVICE — NDL FILTER 19GA X 1-1/2

## (undated) DEVICE — SUT 4-0 CHROMIC GUT / P-3

## (undated) DEVICE — CATH IV CATHLON W/HUB14GAX

## (undated) DEVICE — ELECTRODE REM PLYHSV RETURN 9

## (undated) DEVICE — SEE L#120831

## (undated) DEVICE — TRACKER ENT INSTRUMENT

## (undated) DEVICE — SUT PROLENE 2-0 36 V-7V-7